# Patient Record
Sex: FEMALE | Race: BLACK OR AFRICAN AMERICAN | URBAN - METROPOLITAN AREA
[De-identification: names, ages, dates, MRNs, and addresses within clinical notes are randomized per-mention and may not be internally consistent; named-entity substitution may affect disease eponyms.]

---

## 2019-07-25 ENCOUNTER — TELEPHONE (OUTPATIENT)
Dept: TRANSPLANT | Facility: CLINIC | Age: 29
End: 2019-07-25

## 2019-07-25 NOTE — TELEPHONE ENCOUNTER
Sharonda Marte called and stated that she is interested in becoming a living donor for her aunt, Tracie Marte.  Medical and social history obtained.  No contraindications noted.  All questions answered.  Information book emailed to patient. Instructed to contact me with questions or to schedule testing. Patient agreed.

## 2019-08-21 ENCOUNTER — TELEPHONE (OUTPATIENT)
Dept: TRANSPLANT | Facility: CLINIC | Age: 29
End: 2019-08-21

## 2019-08-29 ENCOUNTER — TELEPHONE (OUTPATIENT)
Dept: TRANSPLANT | Facility: CLINIC | Age: 29
End: 2019-08-29

## 2019-08-29 DIAGNOSIS — Z00.5 TRANSPLANT DONOR EVALUATION: Primary | ICD-10-CM

## 2019-08-30 ENCOUNTER — LAB VISIT (OUTPATIENT)
Dept: LAB | Facility: HOSPITAL | Age: 29
End: 2019-08-30
Payer: MEDICARE

## 2019-08-30 DIAGNOSIS — Z00.5 TRANSPLANT DONOR EVALUATION: ICD-10-CM

## 2019-08-30 LAB
ABO GROUP BLD: NORMAL
BLD GP AB SCN CELLS X3 SERPL QL: NORMAL
RH BLD: NORMAL

## 2019-08-30 PROCEDURE — 81380 HLA I TYPING 1 LOCUS HR: CPT | Mod: PO,TXP

## 2019-08-30 PROCEDURE — 81373 HLA I TYPING 1 LOCUS LR: CPT | Mod: PO,TXP

## 2019-08-30 PROCEDURE — 86901 BLOOD TYPING SEROLOGIC RH(D): CPT | Mod: TXP

## 2019-08-30 PROCEDURE — 81373 HLA I TYPING 1 LOCUS LR: CPT | Mod: 91,PO,TXP

## 2019-08-30 PROCEDURE — 81376 HLA II TYPING 1 LOCUS LR: CPT | Mod: PO,TXP

## 2019-08-30 PROCEDURE — 81376 HLA II TYPING 1 LOCUS LR: CPT | Mod: 91,PO,TXP

## 2019-08-30 PROCEDURE — 36415 COLL VENOUS BLD VENIPUNCTURE: CPT | Mod: TXP

## 2019-08-30 PROCEDURE — 86900 BLOOD TYPING SEROLOGIC ABO: CPT | Mod: TXP

## 2019-08-30 PROCEDURE — 86850 RBC ANTIBODY SCREEN: CPT | Mod: TXP

## 2019-09-09 ENCOUNTER — TELEPHONE (OUTPATIENT)
Dept: TRANSPLANT | Facility: CLINIC | Age: 29
End: 2019-09-09

## 2019-09-09 DIAGNOSIS — Z00.5 TRANSPLANT DONOR EVALUATION: Primary | ICD-10-CM

## 2019-09-09 NOTE — TELEPHONE ENCOUNTER
Patient notified that the results of the crossmatch with her aunt show that they are compatible. Patient states he would like to proceed with the medical evaluation. Required testing was discussed and information provided to schedule appointments.  Patient will see her local gyn for routine exam and have results faxed to us. All questions were answered and patient verbalized understanding.

## 2019-09-10 LAB — HLATY INTERPRETATION: NORMAL

## 2019-09-16 LAB
HLA DRB4 1: NORMAL
HLA SSO DNA TYPING CLASS I & II INTERPRETATION: NORMAL
HLA-A 1 SERO. EQUIV: 3
HLA-A 1: NORMAL
HLA-A 2 SERO. EQUIV: NORMAL
HLA-A 2: NORMAL
HLA-B 1 SERO. EQUIV: 44
HLA-B 1: NORMAL
HLA-B 2 SERO. EQUIV: 49
HLA-B 2: NORMAL
HLA-BW 1 SERO. EQUIV: 4
HLA-BW 2 SERO. EQUIV: NORMAL
HLA-C 1: NORMAL
HLA-C 2: NORMAL
HLA-C1 HI RES: NORMAL
HLA-CW 1 SERO. EQUIV: 4
HLA-CW 2 SERO. EQUIV: 7
HLA-DQ 1 SERO. EQUIV: 6
HLA-DQ 2 SERO. EQUIV: NORMAL
HLA-DQB1 1: NORMAL
HLA-DQB1 2: NORMAL
HLA-DRB1 1 SERO. EQUIV: 13
HLA-DRB1 1: NORMAL
HLA-DRB1 2 SERO. EQUIV: 15
HLA-DRB1 2: NORMAL
HLA-DRB3 1: NORMAL
HLA-DRB3 2: NORMAL
HLA-DRB345 1 SERO. EQUIV: 52
HLA-DRB345 2 SERO. EQUIV: 51
HLA-DRB4 2: NORMAL
HLA-DRB5 1: NORMAL
HLA-DRB5 2: NORMAL
HRC TESTING DATE: NORMAL
Lab: NORMAL
SSDQB TESTING DATE: NORMAL
SSDRB TESTING DATE: NORMAL
SSOA TESTING DATE: NORMAL
SSOB TESTING DATE: NORMAL
SSOC TESTING DATE: NORMAL
SSODR TESTING DATE: NORMAL
TYSSO TESTING DATE: NORMAL

## 2019-09-23 ENCOUNTER — TELEPHONE (OUTPATIENT)
Dept: TRANSPLANT | Facility: CLINIC | Age: 29
End: 2019-09-23

## 2019-09-23 NOTE — TELEPHONE ENCOUNTER
Patient called, will start urine collection today. Lab appointments rescheduled for 9/26/19.   Reviewed collection instructions for 24 hour urine collection and CCMS sample. All questions were answered and patient verbalized understanding.

## 2019-10-03 ENCOUNTER — LAB VISIT (OUTPATIENT)
Dept: LAB | Facility: HOSPITAL | Age: 29
End: 2019-10-03
Attending: NURSE PRACTITIONER
Payer: MEDICAID

## 2019-10-03 DIAGNOSIS — Z00.5 TRANSPLANT DONOR EVALUATION: ICD-10-CM

## 2019-10-03 LAB
ABO + RH BLD: NORMAL
ALBUMIN SERPL BCP-MCNC: 4.2 G/DL (ref 3.5–5.2)
ALP SERPL-CCNC: 50 U/L (ref 55–135)
ALT SERPL W/O P-5'-P-CCNC: 16 U/L (ref 10–44)
ANION GAP SERPL CALC-SCNC: 14 MMOL/L (ref 8–16)
APTT BLDCRRT: 32.5 SEC (ref 21–32)
AST SERPL-CCNC: 19 U/L (ref 10–40)
BASOPHILS # BLD AUTO: 0.01 K/UL (ref 0–0.2)
BASOPHILS NFR BLD: 0.4 % (ref 0–1.9)
BILIRUB SERPL-MCNC: 0.8 MG/DL (ref 0.1–1)
BUN SERPL-MCNC: 10 MG/DL (ref 6–20)
CALCIUM SERPL-MCNC: 9.2 MG/DL (ref 8.7–10.5)
CHLORIDE SERPL-SCNC: 104 MMOL/L (ref 95–110)
CHOLEST SERPL-MCNC: 116 MG/DL (ref 120–199)
CHOLEST/HDLC SERPL: 2.2 {RATIO} (ref 2–5)
CO2 SERPL-SCNC: 22 MMOL/L (ref 23–29)
CREAT SERPL-MCNC: 0.8 MG/DL (ref 0.5–1.4)
DIFFERENTIAL METHOD: ABNORMAL
EOSINOPHIL # BLD AUTO: 0.2 K/UL (ref 0–0.5)
EOSINOPHIL NFR BLD: 5.3 % (ref 0–8)
ERYTHROCYTE [DISTWIDTH] IN BLOOD BY AUTOMATED COUNT: 11.9 % (ref 11.5–14.5)
EST. GFR  (AFRICAN AMERICAN): >60 ML/MIN/1.73 M^2
EST. GFR  (NON AFRICAN AMERICAN): >60 ML/MIN/1.73 M^2
ESTIMATED AVG GLUCOSE: 114 MG/DL (ref 68–131)
GLUCOSE SERPL-MCNC: 111 MG/DL
GLUCOSE SERPL-MCNC: 85 MG/DL
GLUCOSE SERPL-MCNC: 86 MG/DL (ref 70–110)
GLUCOSE SERPL-MCNC: 90 MG/DL (ref 70–110)
HBA1C MFR BLD HPLC: 5.6 % (ref 4–5.6)
HCG INTACT+B SERPL-ACNC: <1.2 MIU/ML
HCT VFR BLD AUTO: 37.5 % (ref 37–48.5)
HDLC SERPL-MCNC: 52 MG/DL (ref 40–75)
HDLC SERPL: 44.8 % (ref 20–50)
HGB BLD-MCNC: 12.4 G/DL (ref 12–16)
IMM GRANULOCYTES # BLD AUTO: 0 K/UL (ref 0–0.04)
IMM GRANULOCYTES NFR BLD AUTO: 0 % (ref 0–0.5)
INR PPP: 1.3 (ref 0.8–1.2)
LDLC SERPL CALC-MCNC: 56 MG/DL (ref 63–159)
LYMPHOCYTES # BLD AUTO: 1.2 K/UL (ref 1–4.8)
LYMPHOCYTES NFR BLD: 44.1 % (ref 18–48)
MCH RBC QN AUTO: 30 PG (ref 27–31)
MCHC RBC AUTO-ENTMCNC: 33.1 G/DL (ref 32–36)
MCV RBC AUTO: 91 FL (ref 82–98)
MONOCYTES # BLD AUTO: 0.5 K/UL (ref 0.3–1)
MONOCYTES NFR BLD: 18.5 % (ref 4–15)
NEUTROPHILS # BLD AUTO: 0.9 K/UL (ref 1.8–7.7)
NEUTROPHILS NFR BLD: 31.7 % (ref 38–73)
NONHDLC SERPL-MCNC: 64 MG/DL
NRBC BLD-RTO: 0 /100 WBC
PHOSPHATE SERPL-MCNC: 2.9 MG/DL (ref 2.7–4.5)
PLATELET # BLD AUTO: 288 K/UL (ref 150–350)
PMV BLD AUTO: 10.1 FL (ref 9.2–12.9)
POTASSIUM SERPL-SCNC: 3.3 MMOL/L (ref 3.5–5.1)
PROT SERPL-MCNC: 7.6 G/DL (ref 6–8.4)
PROTHROMBIN TIME: 13.2 SEC (ref 9–12.5)
RBC # BLD AUTO: 4.13 M/UL (ref 4–5.4)
SODIUM SERPL-SCNC: 140 MMOL/L (ref 136–145)
TRIGL SERPL-MCNC: 40 MG/DL (ref 30–150)
WBC # BLD AUTO: 2.81 K/UL (ref 3.9–12.7)

## 2019-10-03 PROCEDURE — 84100 ASSAY OF PHOSPHORUS: CPT | Mod: TXP

## 2019-10-03 PROCEDURE — 86635 COCCIDIOIDES ANTIBODY: CPT | Mod: TXP

## 2019-10-03 PROCEDURE — 82951 GLUCOSE TOLERANCE TEST (GTT): CPT | Mod: TXP

## 2019-10-03 PROCEDURE — 83036 HEMOGLOBIN GLYCOSYLATED A1C: CPT | Mod: TXP

## 2019-10-03 PROCEDURE — 80061 LIPID PANEL: CPT | Mod: TXP

## 2019-10-03 PROCEDURE — 85025 COMPLETE CBC W/AUTO DIFF WBC: CPT | Mod: TXP

## 2019-10-03 PROCEDURE — 86703 HIV-1/HIV-2 1 RESULT ANTBDY: CPT | Mod: TXP

## 2019-10-03 PROCEDURE — 87340 HEPATITIS B SURFACE AG IA: CPT | Mod: TXP

## 2019-10-03 PROCEDURE — 86644 CMV ANTIBODY: CPT | Mod: TXP

## 2019-10-03 PROCEDURE — 86803 HEPATITIS C AB TEST: CPT | Mod: TXP

## 2019-10-03 PROCEDURE — 86704 HEP B CORE ANTIBODY TOTAL: CPT | Mod: TXP

## 2019-10-03 PROCEDURE — 85730 THROMBOPLASTIN TIME PARTIAL: CPT | Mod: TXP

## 2019-10-03 PROCEDURE — 86480 TB TEST CELL IMMUN MEASURE: CPT | Mod: TXP

## 2019-10-03 PROCEDURE — 86706 HEP B SURFACE ANTIBODY: CPT | Mod: TXP

## 2019-10-03 PROCEDURE — 85610 PROTHROMBIN TIME: CPT | Mod: TXP

## 2019-10-03 PROCEDURE — 80053 COMPREHEN METABOLIC PANEL: CPT | Mod: TXP

## 2019-10-03 PROCEDURE — 86592 SYPHILIS TEST NON-TREP QUAL: CPT | Mod: TXP

## 2019-10-03 PROCEDURE — 84702 CHORIONIC GONADOTROPIN TEST: CPT | Mod: TXP

## 2019-10-03 PROCEDURE — 86901 BLOOD TYPING SEROLOGIC RH(D): CPT | Mod: TXP

## 2019-10-03 PROCEDURE — 36415 COLL VENOUS BLD VENIPUNCTURE: CPT | Mod: TXP

## 2019-10-03 PROCEDURE — 86665 EPSTEIN-BARR CAPSID VCA: CPT | Mod: TXP

## 2019-10-03 PROCEDURE — 86682 HELMINTH ANTIBODY: CPT | Mod: TXP

## 2019-10-04 LAB
EBV VCA IGG SER QL IA: POSITIVE
HBV SURFACE AB SER-ACNC: POSITIVE M[IU]/ML

## 2019-10-07 LAB
CMV AB TOTAL, DONOR EVAL (BLOOD CENTER): REACTIVE
HEPATITIS B CORE  AB, DONOR EVAL, (BLOOD CENTER): NORMAL
HEPATITIS B SURFACE AG, DONOR EVAL, (BLOOD CENTER): NORMAL
HEPATITIS C ANTIBODY,DONOR EVAL (BLOOD CENTER): NORMAL
HIV1/2 AG/AB, DONOR EVAL (BLOOD CENTER): NORMAL
M TB IFN-G CD4+ BCKGRND COR BLD-ACNC: 0 IU/ML
MITOGEN IGNF BCKGRD COR BLD-ACNC: >10 IU/ML
MITOGEN IGNF BCKGRD COR BLD-ACNC: NEGATIVE [IU]/ML
NIL: 0.05 IU/ML
RPR, DONOR EVAL (BLOOD CENTER): NORMAL
TB2 - NIL: -0.01 IU/ML

## 2019-10-09 ENCOUNTER — TELEPHONE (OUTPATIENT)
Dept: TRANSPLANT | Facility: CLINIC | Age: 29
End: 2019-10-09

## 2019-10-09 NOTE — TELEPHONE ENCOUNTER
Message left for patient that results of the blood and urine testing done are acceptable. Patient instructed to call to schedule the rest of her testing. Phone number provided.

## 2019-10-10 LAB — STRONGYLOIDES ANTIBODY IGG: NEGATIVE

## 2019-10-11 ENCOUNTER — TELEPHONE (OUTPATIENT)
Dept: TRANSPLANT | Facility: CLINIC | Age: 29
End: 2019-10-11

## 2019-10-11 NOTE — TELEPHONE ENCOUNTER
"Returned pt's call regarding lab results. Spoke with pt regarding low WBC count on CBC. She states that she had diarrhea and vomiting all day on 10/3/19, the day before her labs were drawn on 10/4/19. Explained that we will want to repeat the CBC test to re-assess her WBC count. Educated on the next step in the donor evaluation process. Irasema wishes to wait until every lab drawn on 10/4/19 is resulted before she repeats the CBC "incase something else needs to be done". Assured her that we will contact her next week with all results.   She is interested in continuing the evaluation, but does not wish to schedule her appts until all of her labs are resulted.   All questions answered              ----- Message from Katelyn Rodriguez sent at 10/11/2019  9:20 AM CDT -----  Contact: Patient @ [phone]      ----- Message -----  From: Joshua Mak  Sent: 10/11/2019   9:11 AM CDT  To: Nicole Benites Staff    Patient requesting a return call to discuss lab results, pls advise     "

## 2019-10-12 LAB
ASPERGILLUS AB SER QL ID: NORMAL
B DERMAT AB SER QL ID: NORMAL
C IMMITIS AB SER QL ID: NORMAL
H CAPSUL AB SER QL ID: NORMAL

## 2019-10-14 ENCOUNTER — TELEPHONE (OUTPATIENT)
Dept: TRANSPLANT | Facility: CLINIC | Age: 29
End: 2019-10-14

## 2019-10-14 NOTE — TELEPHONE ENCOUNTER
Patient notified that all lab results are back and we can proceed with the rest of the testing. Instructed patient to call Tenisha to schedule the remainder of the testing. Patient agreed.

## 2019-11-28 ENCOUNTER — HOSPITAL ENCOUNTER (EMERGENCY)
Facility: HOSPITAL | Age: 29
Discharge: LEFT BEFORE DISCHARGE | End: 2019-11-28
Attending: EMERGENCY MEDICINE
Payer: MEDICAID

## 2019-11-28 VITALS
BODY MASS INDEX: 28.42 KG/M2 | WEIGHT: 176.81 LBS | OXYGEN SATURATION: 99 % | HEIGHT: 66 IN | SYSTOLIC BLOOD PRESSURE: 148 MMHG | RESPIRATION RATE: 18 BRPM | TEMPERATURE: 99 F | DIASTOLIC BLOOD PRESSURE: 84 MMHG | HEART RATE: 88 BPM

## 2019-11-28 DIAGNOSIS — Z53.21 ELOPED FROM EMERGENCY DEPARTMENT: Primary | ICD-10-CM

## 2019-11-28 DIAGNOSIS — R09.81 NASAL CONGESTION: ICD-10-CM

## 2019-11-28 DIAGNOSIS — J02.9 SORE THROAT: ICD-10-CM

## 2019-11-28 DIAGNOSIS — R05.9 COUGH: ICD-10-CM

## 2019-11-28 PROCEDURE — 99281 EMR DPT VST MAYX REQ PHY/QHP: CPT | Mod: NTX

## 2019-11-29 DIAGNOSIS — Z00.5 TRANSPLANT DONOR EVALUATION: Primary | ICD-10-CM

## 2019-11-29 NOTE — ED PROVIDER NOTES
SCRIBE #1 NOTE: I, Shashi Mcneill, am scribing for, and in the presence of, Bart Steven NP. I have scribed the entire note.      History      Chief Complaint   Patient presents with    Sore Throat     sneezing, body aches since yesterday.        Review of patient's allergies indicates:  No Known Allergies     HPI   HPI    11/28/2019, 8:52 PM   History obtained from the patient      History of Present Illness: Irasema Marte is a 28 y.o. female patient who presents to the Emergency Department for sore throat which onset gradually yesterday. Symptoms are constant and moderate in severity. No mitigating or exacerbating factors reported. Associated sxs include sneezing, body ache, and fever. Patient denies any cp, sob, cough, congest, n/v/d, ha, dizziness, and all other sxs at this time. Prior Tx includes dayqui, Mucinex, advil with no relief. No further complaints or concerns at this time.         Arrival mode: Personal vehicle    PCP: Primary Doctor No       Past Medical History:  History reviewed. No pertinent medical history.     Past Surgical History:  History reviewed. No pertinent surgical history.       Family History:  History reviewed. No pertinent family history.     Social History:  Social History     Tobacco Use    Smoking status: Unknown   Substance and Sexual Activity    Alcohol use: Unknown    Drug use: Unknown    Sexual activity: Unknown       ROS   Review of Systems   Constitutional: Positive for fever.        (+) body aches     HENT: Positive for sneezing and sore throat.    Respiratory: Negative for cough and shortness of breath.    Cardiovascular: Negative for chest pain.   Gastrointestinal: Negative for diarrhea, nausea and vomiting.   Genitourinary: Negative for dysuria.   Musculoskeletal: Negative for back pain.   Skin: Negative for rash.   Neurological: Negative for dizziness, weakness and headaches.   Hematological: Does not bruise/bleed easily.   All other systems reviewed and are  "negative.    Physical Exam      Initial Vitals [11/28/19 2032]   BP Pulse Resp Temp SpO2   (!) 148/84 88 18 98.8 °F (37.1 °C) 99 %      MAP       --          Physical Exam  Nursing Notes and Vital Signs Reviewed.  Constitutional: Patient is in no acute distress. Well-developed and well-nourished.  Head: Atraumatic. Normocephalic.  Eyes: PERRL. EOM intact. Conjunctivae are not pale. No scleral icterus.  ENT: Mucous membranes are moist. Oropharynx is clear and symmetric.    Neck: Supple. Full ROM. No lymphadenopathy.  Cardiovascular: Regular rate. Regular rhythm. No murmurs, rubs, or gallops. Distal pulses are 2+ and symmetric.  Pulmonary/Chest: No respiratory distress. Clear to auscultation bilaterally. No wheezing or rales.  Abdominal: Soft and non-distended.  There is no tenderness.  No rebound, guarding, or rigidity. Good bowel sounds.  Genitourinary: No CVA tenderness  Musculoskeletal: Moves all extremities. No obvious deformities. No edema. No calf tenderness.  Skin: Warm and dry.  Neurological:  Alert, awake, and appropriate.  Normal speech.  No acute focal neurological deficits are appreciated.  Psychiatric: Normal affect. Good eye contact. Appropriate in content.    ED Course    Procedures  ED Vital Signs:  Vitals:    11/28/19 2032   BP: (!) 148/84   Pulse: 88   Resp: 18   Temp: 98.8 °F (37.1 °C)   TempSrc: Oral   SpO2: 99%   Weight: 80.2 kg (176 lb 12.9 oz)   Height: 5' 6" (1.676 m)       Imaging Results:  Imaging Results    None                 The Emergency Provider reviewed the vital signs and test results, which are outlined above.    ED Discussion     9:30 PM: Patient has informed nursing staff She is leaving. Patient walked out without waiting for further workup. Able to ambulate without assistance or difficulty. AAO X3 and not intoxicated. Patient has eloped at this time.       ED Medication(s):  Medications - No data to display          Medical Decision Making              Scribe Attestation: "   Scribe #1: I performed the above scribed service and the documentation accurately describes the services I performed. I attest to the accuracy of the note.    Attending:   Physician Attestation Statement for Scribe #1: I, Bart Steven NP, personally performed the services described in this documentation, as scribed by Shashi Mcneill, in my presence, and it is both accurate and complete.          Clinical Impression       ICD-10-CM ICD-9-CM   1. Eloped from emergency department Z53.21 V64.2   2. Nasal congestion R09.81 478.19   3. Cough R05 786.2   4. Sore throat J02.9 462       Disposition:   Disposition: Eloped         Bart Steven NP  11/29/19 0112

## 2020-01-16 ENCOUNTER — HOSPITAL ENCOUNTER (OUTPATIENT)
Dept: CARDIOLOGY | Facility: CLINIC | Age: 30
Discharge: HOME | End: 2020-01-16
Payer: MEDICARE

## 2020-01-16 ENCOUNTER — HOSPITAL ENCOUNTER (OUTPATIENT)
Dept: RADIOLOGY | Facility: HOSPITAL | Age: 30
Discharge: HOME | End: 2020-01-16
Attending: TRANSPLANT SURGERY
Payer: MEDICARE

## 2020-01-16 ENCOUNTER — OFFICE VISIT (OUTPATIENT)
Dept: TRANSPLANT | Facility: CLINIC | Age: 30
End: 2020-01-16
Payer: MEDICARE

## 2020-01-16 ENCOUNTER — HOSPITAL ENCOUNTER (OUTPATIENT)
Dept: RADIOLOGY | Facility: HOSPITAL | Age: 30
Discharge: HOME | End: 2020-01-16
Attending: NURSE PRACTITIONER
Payer: MEDICARE

## 2020-01-16 VITALS
HEIGHT: 60 IN | WEIGHT: 168.63 LBS | DIASTOLIC BLOOD PRESSURE: 74 MMHG | TEMPERATURE: 98 F | SYSTOLIC BLOOD PRESSURE: 115 MMHG | BODY MASS INDEX: 33.11 KG/M2 | RESPIRATION RATE: 18 BRPM | OXYGEN SATURATION: 100 % | HEART RATE: 56 BPM

## 2020-01-16 DIAGNOSIS — Z00.5 TRANSPLANT DONOR EVALUATION: ICD-10-CM

## 2020-01-16 DIAGNOSIS — D70.8 OTHER NEUTROPENIA: ICD-10-CM

## 2020-01-16 DIAGNOSIS — Z00.5 TRANSPLANT DONOR EVALUATION: Primary | ICD-10-CM

## 2020-01-16 PROCEDURE — 99213 OFFICE O/P EST LOW 20 MIN: CPT | Mod: PBBFAC,25,TXP

## 2020-01-16 PROCEDURE — 93010 ELECTROCARDIOGRAM REPORT: CPT | Mod: S$PBB,TXP,, | Performed by: INTERNAL MEDICINE

## 2020-01-16 PROCEDURE — 93005 ELECTROCARDIOGRAM TRACING: CPT | Mod: PBBFAC,TXP | Performed by: INTERNAL MEDICINE

## 2020-01-16 PROCEDURE — 71046 XR CHEST PA AND LATERAL: ICD-10-PCS | Mod: 26,TXP,, | Performed by: RADIOLOGY

## 2020-01-16 PROCEDURE — 25500020 PHARM REV CODE 255: Mod: TXP | Performed by: TRANSPLANT SURGERY

## 2020-01-16 PROCEDURE — 74174 CTA ABDOMEN AND PELVIS: ICD-10-PCS | Mod: 26,TXP,, | Performed by: RADIOLOGY

## 2020-01-16 PROCEDURE — 71046 X-RAY EXAM CHEST 2 VIEWS: CPT | Mod: TC,FY,TXP

## 2020-01-16 PROCEDURE — 71046 X-RAY EXAM CHEST 2 VIEWS: CPT | Mod: 26,TXP,, | Performed by: RADIOLOGY

## 2020-01-16 PROCEDURE — 93010 EKG 12-LEAD: ICD-10-PCS | Mod: S$PBB,TXP,, | Performed by: INTERNAL MEDICINE

## 2020-01-16 PROCEDURE — 99999 PR PBB SHADOW E&M-EST. PATIENT-LVL III: ICD-10-PCS | Mod: PBBFAC,TXP,,

## 2020-01-16 PROCEDURE — 99999 PR PBB SHADOW E&M-EST. PATIENT-LVL III: CPT | Mod: PBBFAC,TXP,,

## 2020-01-16 PROCEDURE — 74174 CTA ABD&PLVS W/CONTRAST: CPT | Mod: 26,TXP,, | Performed by: RADIOLOGY

## 2020-01-16 PROCEDURE — 99203 OFFICE O/P NEW LOW 30 MIN: CPT | Mod: S$PBB,TXP,, | Performed by: TRANSPLANT SURGERY

## 2020-01-16 PROCEDURE — 99203 OFFICE O/P NEW LOW 30 MIN: CPT | Mod: S$PBB,GC,TXP, | Performed by: INTERNAL MEDICINE

## 2020-01-16 PROCEDURE — 97802 MEDICAL NUTRITION INDIV IN: CPT | Mod: PBBFAC,TXP | Performed by: DIETITIAN, REGISTERED

## 2020-01-16 PROCEDURE — 74174 CTA ABD&PLVS W/CONTRAST: CPT | Mod: TC,TXP

## 2020-01-16 PROCEDURE — 99203 PR OFFICE/OUTPT VISIT, NEW, LEVL III, 30-44 MIN: ICD-10-PCS | Mod: S$PBB,TXP,, | Performed by: TRANSPLANT SURGERY

## 2020-01-16 PROCEDURE — 99203 PR OFFICE/OUTPT VISIT, NEW, LEVL III, 30-44 MIN: ICD-10-PCS | Mod: S$PBB,GC,TXP, | Performed by: INTERNAL MEDICINE

## 2020-01-16 RX ORDER — ZOLPIDEM TARTRATE 5 MG/1
5 TABLET ORAL NIGHTLY
COMMUNITY

## 2020-01-16 RX ORDER — LORATADINE 10 MG/1
10 TABLET ORAL DAILY PRN
COMMUNITY
Start: 2019-11-29

## 2020-01-16 RX ORDER — IBUPROFEN 200 MG
200 TABLET ORAL EVERY 6 HOURS PRN
COMMUNITY

## 2020-01-16 RX ORDER — HYDROCORTISONE 25 MG/G
OINTMENT TOPICAL 2 TIMES DAILY PRN
Refills: 2 | COMMUNITY
Start: 2019-10-24

## 2020-01-16 RX ORDER — TRAZODONE HYDROCHLORIDE 100 MG/1
100 TABLET ORAL NIGHTLY
COMMUNITY

## 2020-01-16 RX ADMIN — IOHEXOL 125 ML: 350 INJECTION, SOLUTION INTRAVENOUS at 03:01

## 2020-01-16 NOTE — PATIENT INSTRUCTIONS
From your doctor:    Thank you for volunteering to donate!      Please remember there are medications that can harm your kidneys:  -Avoid nonsteroidal anti-inflamatory drugs (NSAIDS): ibuprofen (Advil, Motrin), naproxen (Aleve, Naprosyn), Indomethacin (Indocin); BC powders. This is especially important if you become dehydrated.  -You should discuss any health food remedies or herbal supplements with your health care team. Some are proven to cause kidney damage.  -Many medications can raise blood pressure. Always read the label.  -It is safe to take ONE aspirin daily (not multiple daily doses)    We strongly recommend yearly check ups with your health care provider.    Consider trying the Mediterranean Diet.    Please feel free to call with any questions or concerns.  We wish to ensure you are comfortable with the donation process every step of the way.    Regards,  Dr. Hailey Morgan

## 2020-01-16 NOTE — PROGRESS NOTES
"DONOR TEACHING NOTE    Met with Irasema Marte today in clinic to review living donor education.        Topics covered included:  · Kidney donation is done voluntarily and of the donor's free will.  Process can stop at any time.   · Better success rates than cadaveric donation, shorter waiting time for the recipient: less than the 3-5 year wait on the list, more time to prepare: tests/surgery can be planned  · Laboratory studies of blood and urine.  Health exams: records of GYN/pap/mammogram (females); evaluation by transplant team and a psychiatrist (if indicated); diagnostic Tests: CXR, EKG, TB skin test, 24-hour urine collection, renal scan, CT of abdomen to assess kidney anatomy, and stress test (if indicated)  · Team will review workup for approval.  Copy of the approved criteria for donation given to patient.  Surgeon will decide which kidney will be donated.   · Benefits of laparoscopic nephrectomy: less pain, shorter hospital stay, a shorter recovery period.  Risks discussed: bleeding, deep vein thrombosis, pulmonary embolus, the need for re-operation.  Your operation may be converted to an "open" procedure if the surgeon feels it is medically necessary.  · To recovery room, transfer to TSU, if space allows or semi-private room.  Olvera catheter/IV, average hospital stay is 1 day.  At discharge the cost of any prescriptions is the donor's responsibility.  · Post-operative visit 4 weeks after surgery or prior to returning to work, unless a complication arises and you need to be seen sooner.  Off of work for about 3 to 6 weeks, no driving for at least the first 3 weeks.  General surgical complications: infection, allergic reaction, and anesthesia.   · Long life considerations of living with one kidney: risk of HTN and kidney failure   · Following up with PCP 6 months after donation then yearly thereafter to monitor kidney function.  This should include weight, labs, urinalysis, and a blood pressure check.  We " are required to report your progress to UNOS at 6 months, 1 year, and 2 years post-donation.     Written educational material provided. Informed consent reviewed and signed. All questions were answered and patient verbalized understanding.     BMI at 32.55.  Discussed BMI requirements for right (30) and left (33) kidney donation.  Discussed lifestyle modifications, including weight loss and blood pressure monitoring.      Patient is a suitable candidate for living kidney donation pending adequate blood pressure control and weight loss.

## 2020-01-16 NOTE — PROGRESS NOTES
REFERRING PROVIDER: Freda Hunter MD    REASON FOR VISIT: Weight loss education    PAST MEDICAL HX: Reviewed    LABS: Reviewed    WEIGHT/BMI: 168 lb / 32.6      NUTRITION HX: Pt reports that she drinks a lot of soda. Has been attempting to lose weight over past year but has not been successful. Not active at this time, but would like to start exercising.      INTERVENTION/EDUCATION: Weight loss packet provided and reviewed (plate method for portion/calorie control, weight loss tips, weight management cooking tips, sample menu, exercise recommendations). Provided weight goal of 155 lb for BMI < 30 and daily calorie goal of 1500.      Patient voiced understanding of education & goals. Contact information was provided & will f/u as needed at clinic visits.     Consultation Time: 15 minutes

## 2020-01-16 NOTE — PROGRESS NOTES
Irasema Marte was discussed  with Dr. OPAL Field as outlined.  I have personally seen,  interviewed and evaluated her, reviewed the information in this note, and agree with the findings listed in the attached encounter.   She has a very benign medical history other than her aunt with diabetes and kidney disease.  None of her immediate family members have kidney cancer or kidney failure.  She denies distention of diabetes or gestational hypertension.  I emphasize to her she has the option to opt out of donating at any time and should not feel pressured, coerced, or guilted into donating.  The emphasis on her privacy was reviewed, and she is aware none of her personal health information will be disclosed to anyone (even her potential recipient or family) without her explicit permission.  I also reviewed that she will be at slightly increased risk of preeclampsia after donating compared to prior to donation. She expressed understanding of the plan and agrees with recommendations. At the end of our visit, Irasema voiced she had no further questions for me. I reminded her on how to reach us if further questions develop.     1. Transplant donor evaluation     2. Other neutropenia     -routine workup is already ordered.  -repeat CBC to assess reassess eosinophilia and neutropenia.  This appears to be a benign process, but may need further investigation out of an abundance of caution in a potential donor.    Freda Hunter MD  [facility]

## 2020-01-16 NOTE — PROGRESS NOTES
PHARM.D. PRE-TRANSPLANT DONOR CANDIDATE NOTE:    This patient's medication therapy was evaluated as part of her pre-transplant donor candidate  evaluation.      The following general pharmacologic concerns were noted: discussed that ibuprofen should be avoided if she is deemed a candidate for kidney donation        Current Outpatient Medications   Medication Sig Dispense Refill    hydrocortisone 2.5 % ointment Apply topically 2 (two) times daily as needed.  2    ibuprofen (ADVIL,MOTRIN) 200 MG tablet Take 200 mg by mouth every 6 (six) hours as needed for Pain.      loratadine (CLARITIN) 10 mg tablet Take 10 mg by mouth daily as needed.      traZODone (DESYREL) 100 MG tablet Take 100 mg by mouth every evening.      zolpidem (AMBIEN) 5 MG Tab Take 5 mg by mouth every evening.       No current facility-administered medications for this visit.            I am available for consultation and can be contacted, as needed by the other members of the Kidney Transplant team.    Yes

## 2020-01-16 NOTE — PROGRESS NOTES
Transplant Surgery Kidney Donor Evaluation     Referring Physician:     Subjective:     Chief Complaint: Irasema Marte is a 29 y.o. year old female who presents today wishing to be evaluated as a potential living related donor for aunt.    History of Present Illness:  Irasema reports being here without coercion, payment, guilt, or other alternative motives other than wanting to help someone with kidney disease.    Review of Systems   Respiratory: Negative.    Cardiovascular: Negative.    Gastrointestinal: Negative.    Genitourinary: Negative.    All other systems reviewed and are negative.      Objective:   Physical Exam   Constitutional: She appears well-developed and well-nourished.   Vitals reviewed.    ABO type: O POS    Diagnoses:  No diagnosis found.         Transplant Surgery - Candidacy   Assessment/Plan:     Donor Candidacy: Based on information available thus far, Irasema is a suitable candidate for living kidney donation.   She has a borderline blood pressure and obesity (BMI 32.5), so she needs to loss aprox. 15 lb.     Frandy Christian MD       Education: I discussed with the patient the requirements for donation including the compatibility of blood and tissue typing, healthy by physical examination and workup, as well as the desire to donate.  We discussed the risks related to surgery including the risks related to anesthesia, bleeding, infection, inability for surgery to be performed laparoscopically, risks of reoperation as well as the risks of death.  We discussed the length of hospitalization, return to work times, as well as follow-up post-donation.    I discussed the possibility that living donor sometimes encounter problems obtaining health insurance, or could have higher premium despite ongoing efforts of transplant professionals to educate insurance companies on this issue.    I discussed with Irasema that donation is a voluntary activity, and reiterated it should be done willingly and for  altruistic reasons only.  I reviewed that no payment should be made for donating.  I also discussed that coersion, guilt, pressure, or feelings of obligation are not appropriate reasons to donate.  The option to withdraw at any time was emphasized.  Irasema was reminded that a medical opt out can be given to protect her confidentiality, and no member of the transplant team will discuss specifics of her health or medical/social history with anyone else without permission.  The need for lifelong routine medical follow-up for optimal health, including routine health maintenance was reviewed.    Additionally, I discussed the need for our program to be able to contact living donors for UNOS reporting purposes for a minimum of 2 years.  Failure of our center to be able to provide such information could jeopardize our ability to continue to offer living donor transplants.  Irasema voices understanding and agrees to this follow-up.    I discussed the UNOS requirement for centers to report donor status for a minimum of two years. Irasema understands that failure to comply with requirement could have adverse consequences for our transplant program, and agrees to cooperate with all our required follow-up.    I reviewed with Irasema available lab results and other diagnostics from the evaluation process.

## 2020-01-16 NOTE — PROGRESS NOTES
Kidney Transplant Donor Evaluation    Subjective:       CC:  Initial evaluation of kidney donor candidacy.    HPI:  Ms. Marte is a 29 y.o. year old AA female PH of allergies who has presented to be evaluated as a potential living related donor for her aunt.  Ms. Marte reports being here without coercion, payment, guilt or other alternative motives other than wanting to help someone with kidney disease. Pt reports she is healthy, works at [employer] and can no difficulties with ADL's/IADLs. Has great support system. Does take loratidine for allergies and occasionally ibuprofen (800mg ~1x weekly for headaches). She has one healthy child, no gestational HTN and DM. No complaints today on ros.     Patient denies any history of coronary artery disease, stroke, seizure disorder, chronic obstructive pulmonary disease, liver disease, kidney stones, gallstones, deep venous thrombosis, pulmonary embolism, recurrent urinary tract infections or malignancies.  Review of Systems   Constitutional: Negative for activity change and appetite change.   HENT: Negative for sinus pressure and sinus pain.    Respiratory: Negative for chest tightness and shortness of breath.    Cardiovascular: Negative for chest pain and palpitations.   Gastrointestinal: Negative for abdominal distention and abdominal pain.   Musculoskeletal: Negative for arthralgias and myalgias.   Skin: Negative for pallor and rash.   Allergic/Immunologic: Positive for environmental allergies. Negative for immunocompromised state.   Neurological: Negative for weakness and headaches.   Psychiatric/Behavioral: Negative for agitation and behavioral problems.       Objective:   Physical Exam   Constitutional: She is oriented to person, place, and time. She appears well-developed and well-nourished.   HENT:   Head: Normocephalic and atraumatic.   + nasal crease   Eyes: Pupils are equal, round, and reactive to light. EOM are normal.   Neck: Normal range of motion. Neck  supple.   Cardiovascular: Normal rate, regular rhythm, normal heart sounds and intact distal pulses. Exam reveals no gallop and no friction rub.   No murmur heard.  Pulmonary/Chest: Effort normal and breath sounds normal. No respiratory distress.   Abdominal: Soft. Bowel sounds are normal.   Musculoskeletal: Normal range of motion.   Neurological: She is alert and oriented to person, place, and time.   Skin: Skin is warm and dry.   Psychiatric: She has a normal mood and affect. Her behavior is normal.       Labs:        ABO type: O POS    Assessment:   Ms. Marte is a 29 y.o. year old AA female PH of allergies who has presented to be evaluated as a potential living related donor for her aunt.     Plan:   Donor Candidacy:   Based on the given information, Ms. Marte appears to be an excellent candidate for kidney donation.  A final recommendation will be made by the selection committee after reviewing her complete workup.    Peter Field MD       Counseling:   I discussed with Ms. Marte that donation is voluntary and reiterated it should be done willingly and for altruistic reasons only.  I reviewed that no payment should be received for donating.  I also discussed that coercion, guilt, pressure, or feelings of obligation are not appropriate reasons to donate.  The option to withdraw at any time was emphasized.  Ms. Marte was reminded that a medical opt out can be given to protect her confidentiality, and no member of the transplant team will discuss specifics of her health or medical/social history with anyone else without permission.  The need for lifelong routine medical follow-up for optimal health, including routine health maintenance was reviewed.    We also discussed the long term risks associated with kidney donation.  I told the patient that her GFR should return to within 75-80% of pre-donation level within six months of donation.  I informed the patient that there is a small risk of developing  albuminuria and hypertension following donor nephrectomy.  I also informed the patient that based on current literature, the risk of developing end-stage renal disease following donor nephrectomy is similar to the general population.. Counseled on increased risk of preeclampsia post donation    I reviewed with Ms. Marte available lab results and other diagnostics from the evaluation process    Additional Counseling:   The patient was counseled on the need for regular follow-up with a primary care physician for blood pressure and cholesterol screening.  The importance of age appropriate health screening was also emphasized.    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

## 2020-01-17 ENCOUNTER — TELEPHONE (OUTPATIENT)
Dept: TRANSPLANT | Facility: CLINIC | Age: 30
End: 2020-01-17

## 2020-01-17 NOTE — TELEPHONE ENCOUNTER
Spoke with patient regarding test results. Informed that we would like to obtain some additional blood pressure readings. BP log emailed to patient with instructions to monitor BP twice a day for 10 days and return to us for review. All questions were answered and patient verbalized understanding.

## 2020-01-20 NOTE — PROGRESS NOTES
TRANSPLANT DONOR PSYCHOSOCIAL ASSESSMENT    Irasema Marte  [address]  [facility] [address]  Telephone Information:   Mobile [phone]     Home [phone] (home)  Work  There is no work phone number on file.  E-mail  No e-mail address on record    PHS Increased Risk Behavioral Questionnaire reviewed by : Yes   addressed any PHS increased risk behaviors or concerns. Resources and education provided as appropriate.    Sex: female  YOB: 1990  Age: 29 y.o.    Encounter Date: 1/16/2020  [place] Citizen: yes  Primary Language: English   Needed: no   Transportation:  Pt reports does drive/own car.     Potential Recipient: Tracie Marte  Clinic Number: [id number]  Donor's Relationship to Patient: niece    Emergency Contact:  Boni Arroyo, 30 yo boyfriend, [facility], does drive/own car, works full time at [employer] as . [phone]    Family/Social Support:   Number of dependents/: Dipika Arroyo, 16 mos old daughter. Family will assist with childcare for transplant  Marital history: never . With Boni Arroyo for 5 1/2 years. They live together and have child together.  Other family dynamics: Pt reports having large and supportive family living nearby. Boni Arroyo with patient today for organ transplant evaluation and reports will be primary transplant caregiver. Pt reports having other family available for transplant:  Melinda Estuardo (aunt), Jennifer Estuardo (cousin), Debra Marte (sister) and Brenda Estuardo (cousin).     Household Composition:  Boni Arroyo, 30 yo boyfriend, [facility], does drive/own car, works full time at [employer] as . [phone]  Dipika Arroyo, 16 mos old daughter    Do you and your caregivers have access to reliable transportation? yes  PRIMARY CAREGIVER: ely Barienshelley, will be primary caregiver, phone number [phone]        provided in-depth information  to patient and caregiver regarding pre- and post-transplant caregiver role.   strongly encourages patient and caregiver to have concrete plan regarding post-transplant care giving, including back-up caregiver(s) to ensure care giving needs are met as needed.    Patient and Caregiver states understanding all aspects of caregiver role/commitment and is able/willing/committed to being caregiver to the fullest extent necessary.    Patient and Caregiver verbalizes understanding of the education provided today and caregiver responsibilities.         remains available. Patient and Caregiver agree to contact  in a timely manner if concerns arise.      Able to take time off work without financial concerns: yes.     Additional Significant Others who will Assist with Transplant:  Melinda Marte ,aunt, does drive, unemployed. [phone]  Jennifer Marte cousin, does drive, unemployed. [phone]   Debra Marte sister, does drive, unemployed. [phone]   Brenda Marte, 34 yo cousin, does drive/own car, works full time as apartment . [phone]     Living Will: no  Healthcare Power of : no  Advance Directives on file: <no information> per medical record.  Verbally reviewed LW/HCPA information.   provided patient with copy of LW/HCPA documents and provided education on completion of forms.    Education: high school  Reading Ability: 8th grade  Reports difficulty with: N/A  Learns [employer]:  multisensory     Status: no  VA Benefits: no     Employment:  Part time  at [employer] for about 5 years. Earns about $500 a month. Pt reports live in boyfriend Boni pays for all expenses.   Fundraising and NLDAC information provided to patient.  Patient and Caregiver verbalizes understanding.   remains available.    Spouse/Significant Other Employment:  Boni (significant other) reports works full time at [employer] as  .    Insurance: see potential recipient's insurance for donor coverage. Pt reports having [place] Medicaid medical insurance in place.    Does the donor have health insurance? Yes. [place] Medicaid. Pt reports usually obtains out patient medications from [facility]    Patient and Caregiver verbalizes clear understanding that patient may experience difficulty obtaining and/or be denied insurance coverages post-surgery.  This includes and is not limited to disability insurance, life insurance, health insurance, burial insurance, long term care insurance, and other insurances.  Patient also reports understanding that future health concerns related to or unrelated to transplantation may not be covered by patient's insurance.  Resources and information provided and reviewed.      Patient and Caregiver provides verbal permission to release any necessary information to outside resources for patient care and discharge planning.  Resources and information provided and reviewed.      Infusion Service: patient utilizing? no  Home Health: patient utilizing? no  DME: no  ADLS:  independent    Adherence:   Pt reports is highly compliant with all medical appointments and instructions.   Adherence education and counseling provided    Per History Section:  History reviewed. No pertinent past medical history.  Social History     Tobacco Use    Smoking status: Never Smoker    Smokeless tobacco: Never Used   Substance Use Topics    Alcohol use: Yes     Comment: 1/month     Social History     Substance and Sexual Activity   Drug Use Never     Social History     Substance and Sexual Activity   Sexual Activity Not on file       Per Today's Psychosocial:  Please review above table for patient's reported substance use.    Patient and Caregiver states clear understanding of the potential impact of substance use as it relates to donor candidacy and is agreeable to random substance screening.  Substance abstinence/cessation counseling, education  "and resources provided and reviewed.     Arrests/DWI/Treatment/Rehab: patient denies    Psychiatric History:    Mental Health: Pt denies any overwhelming feelings of depression or anxiety at this time. Pt reports experiencing normal grieving process when her "mother" (who really was her aunt)  about 1 year ago. Pt reports had problems sleeping during grieving process and was prescribed by PCP Ambien 5 mg and Trazodone 100 mg for sleep. Pt denies any need for mental health referral at this time.  Psychiatrist/Counselor: pt denies  Medications:  Ambien 5 mg and trazodone 100 mg for sleep  Suicide/Homicide Issues: pt denies any si/hi history   Safety at home: pt reports living in safe home environment with no abuse    Donation Knowledge/Expectations: Patient and Caregiver states having clear understanding and realistic expectations regarding the potential risks and potential benefits of organ transplantation and organ donation and agrees to discuss with health care team members and support system members, as well as to utilize available resources and express questions and/or concerns in order to further facilitate the patients informed decision-making.  Resources and information provided and reviewed.     Decision-making Process:  Pt reports it was her idea to be evaluated for organ donation. Pt reports the potential donor (aunt) is on dialysis for 2 years and patient sees a successful organ transplant as an opportunity for her aunt to be healthier and more independent. Patient and Caregiver states understanding that transplant and donation are not a guarantee that the donated organ will function.  Patient and Caregiver states understanding of kidney treatment options available for kidney patients, psychosocial aspects surrounding organ donation and transplantation as well as recovery.  Patient and Caregiver also states clear understanding that patient may choose to not donate at any time prior to surgery taking " place, and that patient confidentiality is protected.  Patient and Caregiver reports expected compliance with health care regime and states understanding of importance of compliance.  Educational information provided.    Patient and Caregiver reports having a clear understanding  that risks and benefits may be involved with organ transplantation and with organ donation and  of the treatment options available to a potential transplant recipient. Patient and Caregiver reports clear understanding that psychosocial risk factors which may affect patient, including but not limited to feelings of depression, generalized anxiety, anxiety regarding dependence on others, post traumatic stress disorder, feelings of guilt and other emotional and/or mental concerns, and/or exacerbation of existing mental health concerns.     Detailed resources and education provided and discussed. Patient and Caregiver agrees to access appropriate resources in a timely manner as needed and to communicate concerns appropriately.      Feelings or Concerns:  Patient and Caregiver denies feelings of coercion, pressure or obligation to donate. Patient and Caregiver states that patient is not receiving any compensation for organ donation. Patient and Caregiver reports motivation to pursue organ donation at this time.     Patient and Caregiver reports having clear and realistic expectations and understanding of the many psychosocial aspects involved with being a living organ donor, including but not limited to costs, compliance, medications, lab work, procedures, appointments, financial planning, preparedness, timely and appropriate communication of concerns, abstinence from non-prescribed drugs or substances, importance of adherence to and follow-through with all health care team recommendations, participation in health care and treatment planning, utilization of resources and follow-through, mental health counseling as needed and/or recommended, and  the patient and caregiver responsibilities.  Patient and Caregiver states having a clear understanding of possible difficulty obtaining or possibility of being denied insurance coverage post-surgery.  This includes and is not limited to disability insurance, life insurance, health insurance, burial insurance, long-term care insurance and other insurances.  Educational and resource information provided and reviewed.  Patient and Caregiver also reports understanding that future health concerns related to or unrelated to organ donation may not be covered by patients insurance.    Coping: Identify Patient & Caregiver Strategies to [place]:   1. Currently & Pre-transplant - luis a and prayer; family support   2. At the time of organ donation surgery - luis a and prayer; family support   3. During post-Organ donation & Recovery Period - luis a and prayer; family support    Interview Behavior: Irasema presents as alert and oriented x 4, pleasant, good eye contact, well groomed, recall good, concentration/judgement good, average intelligence, calm, communicative, cooperative and asking and answering questions appropriately.  Pt's boyfriend Boni in for part of the session to confirm transplant caregiver role and plan.    Suitability for Donation: Irasema Marte presents as a suitable candidate for donation at this time.    Recommendations/Additional Comments: Pt reports will review [facility] brochure to see if she is interested in applying for assistance. Pt reports does work and may need employer paperwork completed for time missed due to transplant. Pt reports does have medical insurance [place] Medicaid and usually obtains out patient medicines from [facility].    Final determination of transplant candidacy will be made once work up is complete and reviewed by the selection committee.    Felipa Arredondo MSW \[facility] [place] [facility]\""W

## 2020-04-02 ENCOUNTER — TELEPHONE (OUTPATIENT)
Dept: TRANSPLANT | Facility: CLINIC | Age: 30
End: 2020-04-02

## 2020-04-02 NOTE — TELEPHONE ENCOUNTER
Emailed pt diet information regarding weight loss and controlling blood pressure as requested per pt. Recommended limiting sodium and to increase fruits, vegetables, and dairy products for blood pressure control. Pt provided weight loss materials during clinic visit, so reminded pt of importance of exercise and to update me with her progress so that additional appropriate materials can be sent to her. Contact information provided.

## 2020-04-02 NOTE — TELEPHONE ENCOUNTER
Patient called requesting information on a diet to lose weight. States she has not had a chance to record her blood pressure readings. States she will work on obtaining BP readings. Asked dietitian to email patient information. All questions were answered and patient verbalized understanding.

## 2020-11-23 ENCOUNTER — TELEPHONE (OUTPATIENT)
Dept: TRANSPLANT | Facility: CLINIC | Age: 30
End: 2020-11-23

## 2021-04-05 ENCOUNTER — DOCUMENTATION ONLY (OUTPATIENT)
Dept: TRANSPLANT | Facility: CLINIC | Age: 31
End: 2021-04-05